# Patient Record
Sex: MALE | Race: WHITE | NOT HISPANIC OR LATINO | Employment: OTHER | URBAN - METROPOLITAN AREA
[De-identification: names, ages, dates, MRNs, and addresses within clinical notes are randomized per-mention and may not be internally consistent; named-entity substitution may affect disease eponyms.]

---

## 2017-02-13 RX ORDER — LOSARTAN POTASSIUM 100 MG/1
TABLET ORAL
Qty: 90 TAB | Refills: 0 | Status: SHIPPED | OUTPATIENT
Start: 2017-02-13 | End: 2019-06-14

## 2017-02-13 NOTE — TELEPHONE ENCOUNTER
Last seen: 08/30/16 by Dr. Ruiz  Next appt: none     Was the patient seen in the last year in this department? Yes   Does patient have an active prescription for medications requested? No   Received Request Via: Pharmacy

## 2017-02-20 ENCOUNTER — HOSPITAL ENCOUNTER (EMERGENCY)
Facility: MEDICAL CENTER | Age: 52
End: 2017-02-20
Attending: EMERGENCY MEDICINE
Payer: MEDICAID

## 2017-02-20 ENCOUNTER — APPOINTMENT (OUTPATIENT)
Dept: RADIOLOGY | Facility: MEDICAL CENTER | Age: 52
End: 2017-02-20
Attending: EMERGENCY MEDICINE
Payer: MEDICAID

## 2017-02-20 VITALS
WEIGHT: 315 LBS | DIASTOLIC BLOOD PRESSURE: 89 MMHG | OXYGEN SATURATION: 95 % | SYSTOLIC BLOOD PRESSURE: 141 MMHG | HEART RATE: 94 BPM | TEMPERATURE: 98.1 F | RESPIRATION RATE: 16 BRPM | BODY MASS INDEX: 40.43 KG/M2 | HEIGHT: 74 IN

## 2017-02-20 DIAGNOSIS — M77.8 LEFT SHOULDER TENDINITIS: ICD-10-CM

## 2017-02-20 PROCEDURE — A9270 NON-COVERED ITEM OR SERVICE: HCPCS | Performed by: EMERGENCY MEDICINE

## 2017-02-20 PROCEDURE — 73030 X-RAY EXAM OF SHOULDER: CPT | Mod: LT

## 2017-02-20 PROCEDURE — 99284 EMERGENCY DEPT VISIT MOD MDM: CPT

## 2017-02-20 PROCEDURE — 700102 HCHG RX REV CODE 250 W/ 637 OVERRIDE(OP): Performed by: EMERGENCY MEDICINE

## 2017-02-20 RX ORDER — OXYCODONE HYDROCHLORIDE AND ACETAMINOPHEN 5; 325 MG/1; MG/1
1-2 TABLET ORAL EVERY 4 HOURS PRN
Qty: 15 TAB | Refills: 0 | Status: SHIPPED | OUTPATIENT
Start: 2017-02-20 | End: 2019-06-14

## 2017-02-20 RX ORDER — OXYCODONE HYDROCHLORIDE AND ACETAMINOPHEN 5; 325 MG/1; MG/1
2 TABLET ORAL ONCE
Status: COMPLETED | OUTPATIENT
Start: 2017-02-20 | End: 2017-02-20

## 2017-02-20 RX ADMIN — OXYCODONE HYDROCHLORIDE AND ACETAMINOPHEN 2 TABLET: 5; 325 TABLET ORAL at 10:39

## 2017-02-20 ASSESSMENT — PAIN SCALES - GENERAL: PAINLEVEL_OUTOF10: 10

## 2017-02-20 NOTE — ED PROVIDER NOTES
ED Provider Note    CHIEF COMPLAINT  Chief Complaint   Patient presents with   • Shoulder Pain     lt shoulder pain       HPI  Jesus Banks is a 51 y.o. male who presents for evaluation of left shoulder pain. The patient has extensive past medical history as listed below. Over the past week he had progressive worsening of left shoulder pain. He specifically denies chest pain or shortness of breath distinctly reports he has pain only with range of motion primarily at the before meals joint. He reports pain is somewhat to the shoulder blade as well. He denies any recent trauma. He thinks he may have slept on it oddly. Denies any pleuritic chest pain no numbness weakness or tingling to the left upper extremity. No history of prior surgery on the left upper extremity or shoulder. The only thing that makes it better is rest moving the shoulder in any degree elicit significant pain    REVIEW OF SYSTEMS  See HPI for further details. No high fevers chills night sweats or weight loss numbness tingling or weakness All other systems are negative.     PAST MEDICAL HISTORY  Past Medical History   Diagnosis Date   • Heart burn    • Indigestion    • Breath shortness    • Snoring    • Cataract    • Arthritis      kn ees and shoulder   • Diabetes      type 1 Insuline + oral meds   • Bronchitis 2013   • Sleep apnea      no cpap use   • Psychiatric problem      bi polar   • Hypertension      well controlled on meds   • Obstructive sleep apnea    • Cortical age-related cataract of right eye    • Pain in left knee    • Sore throat    • Diabetic foot ulcer    • Osteoarthritis of knee      Bilateral    • Low back pain    • Peripheral neuropathy    • Dyslipidemia    • Right ankle pain    • Knee pain, right    • Tobacco use    • Morbid obesity    • Bipolar disorder    • Diabetes mellitus, type II        FAMILY HISTORY  No history of bleeding disorder     SOCIAL HISTORY  Social History     Social History   • Marital Status: Single      Spouse Name: N/A   • Number of Children: N/A   • Years of Education: N/A     Social History Main Topics   • Smoking status: Former Smoker -- 1.00 packs/day for 20 years     Types: Cigarettes     Quit date: 03/12/2014   • Smokeless tobacco: Not on file   • Alcohol Use: No   • Drug Use: No   • Sexual Activity: Not on file     Other Topics Concern   • Not on file     Social History Narrative    former smoker denies IV drugs    SURGICAL HISTORY  Past Surgical History   Procedure Laterality Date   • Lumbar laminectomy diskectomy  2000   • Knee arthroscopy Right 11/5/2015     Procedure: KNEE ARTHROSCOPY , SYNOVECTOMY, OSTEOPHYTE REMOVAL, CHONDROPLASTY, MEDIAL MENISCAL DEBRIDEMENT;  Surgeon: Adama Henriquez M.D.;  Location: SURGERY [facility];  Service:        CURRENT MEDICATIONS  No current facility-administered medications for this encounter.    Current outpatient prescriptions:   •  losartan (COZAAR) 100 MG Tab, TAKE 1 TABLET EVERY DAY, Disp: 90 Tab, Rfl: 0  •  chlorthalidone (HYGROTON) 25 MG Tab, Take 1 Tab by mouth every day., Disp: 90 Tab, Rfl: 0  •  pantoprazole (PROTONIX) 40 MG Tablet Delayed Response, TAKE 1 TABLET BY MOUTH ONCE DAILY, Disp: 90 Tab, Rfl: 0  •  liraglutide (VICTOZA) 18 MG/3ML Solution Pen-injector injection, Inject 0.3 mL as instructed every bedtime., Disp: 6 mL, Rfl: 5  •  RESTASIS 0.05 % ophthalmic emulsion, INSTILL 1 DROP INTO BOTH EYES TWICE A DAY, Disp: , Rfl: 4  •  fluorometholone (FML) 0.1 % Suspension, INSTILL 1 DROP INTO BOTH EYES 4 TIMES A DAY FOR 2 WEEKS THEN TWICE A DAY, Disp: , Rfl: 1  •  ziprasidone (GEODON) 20 MG Cap, ONE CAP BY MOUTH TWICE A DAY WITH A MEAL, Disp: , Rfl: 5  •  Oxycodone-Acetaminophen (PERCOCET-10)  MG Tab, Take 1-2 Tabs by mouth every four hours as needed for Severe Pain., Disp: 20 Tab, Rfl: 0  •  loratadine (CLARITIN) 10 MG Tab, Take 10 mg by mouth every day., Disp: , Rfl:   •  fluticasone (FLONASE) 50 MCG/ACT nasal spray, , Disp: , Rfl: 4  •   metformin (GLUCOPHAGE) 850 MG Tab, Take 1 Tab by mouth 3 times a day before meals., Disp: 90 Tab, Rfl: 5  •  ondansetron (ZOFRAN ODT) 4 MG TABLET DISPERSIBLE, Take 1 Tab by mouth as needed for Nausea/Vomiting., Disp: 10 Tab, Rfl: 2  •  LANTUS SOLOSTAR 100 UNIT/ML Solution Pen-injector injection, Inject 80 Units as instructed every evening., Disp: 1 PEN, Rfl: 5  •  insulin lispro, Human, (HUMALOG) 100 UNIT/ML Solution Pen-injector injection, Inject 5-7 Units as instructed 3 times a day before meals. 2 units for every 5 units over 50, Disp: 1 PEN, Rfl: 3  •  divalproex (DEPAKOTE) 500 MG Tablet Delayed Response, TAKE 1 TABLET BY MOUTH TWICE A DAY, Disp: , Rfl: 1  •  LYRICA 100 MG Cap, TAKE ONE CAPSULE BY MOUTH TWICE A DAY, Disp: 180 Cap, Rfl: 0  •  tramadol (ULTRAM) 50 MG Tab, Take 2 Tabs by mouth every 6 hours as needed. AS NEEDED FOR PAIN, Disp: 180 Tab, Rfl: 0  •  insulin glargine (LANTUS) 100 UNIT/ML Solution, Inject 80 Units as instructed every evening., Disp: , Rfl:   •  polyethylene glycol/lytes (MIRALAX) Pack, Take 17 g by mouth every day., Disp: , Rfl:   •  insulin detemir (LEVEMIR) 100 UNIT/ML Solution Pen-injector injection, Inject  as instructed every evening., Disp: , Rfl:   •  Insulin Pen Needle 31G X 8 MM Misc, by Does not apply route., Disp: , Rfl:   •  glucose blood strip, 1 Each by Other route as needed., Disp: , Rfl:   •  Insulin Pen Needle 32G X 4 MM Misc, by Does not apply route., Disp: , Rfl:   •  mupirocin (BACTROBAN) 2 % Ointment, Apply 1 Each to affected area(s) every day., Disp: , Rfl:       ALLERGIES  Allergies   Allergen Reactions   • Latex      rash   • Other Environmental      Lavender and musk pt states difficulty breathing   • Pcn [Penicillins] Hives and Swelling     Patient states he tolerated amoxicillin/clavulanate with minor pruritis.    • Doxycycline    • Other Misc      BLEACH-Inflammation on skin       PHYSICAL EXAM  VITAL SIGNS: /94 mmHg  Pulse 115  Temp(Src) 37 °C (98.6  "°F)  Resp 16  Ht 1.88 m (6' 2\")  Wt 161.9 kg (356 lb 14.8 oz)  BMI 45.81 kg/m2  SpO2 95% Room air O2: 95    Constitutional: Well developed, Well nourished, No acute distress, Non-toxic appearance.   HENT: Normocephalic, Atraumatic, Bilateral external ears normal, Oropharynx moist, No oral exudates, Nose normal.   Eyes: PERRLA, EOMI, Conjunctiva normal, No discharge.   Neck: Normal range of motion, No tenderness, Supple, No stridor.   Cardiovascular: Normal heart rate, Normal rhythm, No murmurs, No rubs, No gallops.   Thorax & Lungs: Normal breath sounds, No respiratory distress, No wheezing, No chest tenderness.   Abdomen: Bowel sounds normal, Soft, No tenderness, No masses, No pulsatile masses.   Skin: Warm, Dry, No erythema, No rash.   Back: No tenderness, No CVA tenderness.   Extremities: Patient has pain with range of motion in the left shoulder. No erythema no warmth. No bony tenderness over the proximal humerus no evidence of before meals step-off or dislocation neurovascular exam including sensation over the deltoid radial pulse strength is normal.  Neurologic: Alert & oriented x 3, Normal motor function, Normal sensory function, No focal deficits noted.   Psychiatric: Affect normal, Judgment normal, Mood normal.       RADIOLOGY/PROCEDURES  DX-SHOULDER 2+ LEFT   Final Result      No evidence of acute fracture or dislocation.      Mild degenerative changes.      Tiny calcific density adjacent to the humeral head can be seen in calcific tendinopathy.            COURSE & MEDICAL DECISION MAKING  Pertinent Labs & Imaging studies reviewed. (See chart for details)  Patient presents here with left shoulder pain. It is very clearly musculoskeletal and radiograph demonstrates some evidence of calcific tendinopathy which is consistent with his symptoms. I did not think he had an underlying cardiopulmonary process as clearly reports pain with range of motion. I will give him a small prescription of pain medicine " and refer him to orthopedics for ongoing management    FINAL IMPRESSION  1.  1. Left shoulder tendinitis             Electronically signed by: Jerry Catalan, 2/20/2017 10:30 AM

## 2017-02-20 NOTE — ED AVS SNAPSHOT
2/20/2017          Jesus Banks  [address] [facility] [address]    Dear Jesus:    [facility] wants to ensure your discharge home is safe and you or your loved ones have had all your questions answered regarding your care after you leave the hospital.    You may receive a telephone call within two days of your discharge.  This call is to make certain you understand your discharge instructions as well as ensure we provided you with the best care possible during your stay with us.     The call will only last approximately 3-5 minutes and will be done by a nurse.    Once again, we want to ensure your discharge home is safe and that you have a clear understanding of any next steps in your care.  If you have any questions or concerns, please do not hesitate to contact us, we are here for you.  Thank you for choosing [facility] for your healthcare needs.    Sincerely,    Brant Henriquez    [facility]

## 2017-02-20 NOTE — ED AVS SNAPSHOT
Home Care Instructions                                                                                                                Jesus Banks   MRN: [id number]    Department:  [facility], Emergency Dept   Date of Visit:  2/20/2017            [facility], Emergency Dept    [address] [address]    Phone:  [phone]      You were seen by     Jerry Catalan M.D.      Your Diagnosis Was     Left shoulder tendinitis     M75.82       These are the medications you received during your hospitalization from 02/20/2017 0927 to 02/20/2017 1143     Date/Time Order Dose Route Action    02/20/2017 1039 oxycodone-acetaminophen (PERCOCET) 5-325 MG per tablet 2 Tab 2 Tab Oral Given      Follow-up Information     1. Follow up with Fermin Juan M.D. In 1 week.    Specialty:  Orthopaedics    Why:  As needed, If symptoms worsen    Contact information    [address]  [facility] [address]  [phone]        Medication Information     Review all of your home medications and newly ordered medications with your primary doctor and/or pharmacist as soon as possible. Follow medication instructions as directed by your doctor and/or pharmacist.     Please keep your complete medication list with you and share with your physician. Update the information when medications are discontinued, doses are changed, or new medications (including over-the-counter products) are added; and carry medication information at all times in the event of emergency situations.               Medication List      ASK your doctor about these medications        Instructions    chlorthalidone 25 MG Tabs   Commonly known as:  HYGROTON    Doctor's comments:  F/u appt due   Take 1 Tab by mouth every day.   Dose:  25 mg       divalproex 500 MG Tbec   Commonly known as:  DEPAKOTE    TAKE 1 TABLET BY MOUTH TWICE A DAY       fluorometholone 0.1 % Susp   Commonly known as:  FML    INSTILL 1 DROP INTO  BOTH EYES 4 TIMES A DAY FOR 2 WEEKS THEN TWICE A DAY       fluticasone 50 MCG/ACT nasal spray   Commonly known as:  FLONASE        glucose blood strip    1 Each by Other route as needed.   Dose:  1 Each       insulin detemir 100 UNIT/ML Sopn injection   Commonly known as:  LEVEMIR    Inject  as instructed every evening.       * insulin glargine 100 UNIT/ML Soln   Commonly known as:  LANTUS    Inject 80 Units as instructed every evening.   Dose:  80 Units       * LANTUS SOLOSTAR 100 UNIT/ML Sopn injection   Generic drug:  insulin glargine    Inject 80 Units as instructed every evening.   Dose:  80 Units       insulin lispro (Human) 100 UNIT/ML Sopn injection   Commonly known as:  HUMALOG    Inject 5-7 Units as instructed 3 times a day before meals. 2 units for every 5 units over 50   Dose:  5-7 Units       * Insulin Pen Needle 31G X 8 MM Misc    by Does not apply route.       * Insulin Pen Needle 32G X 4 MM Misc    by Does not apply route.       liraglutide 18 MG/3ML Sopn injection   Commonly known as:  VICTOZA    Inject 0.3 mL as instructed every bedtime.   Dose:  1.8 mg       loratadine 10 MG Tabs   Commonly known as:  CLARITIN    Take 10 mg by mouth every day.   Dose:  10 mg       losartan 100 MG Tabs   Commonly known as:  COZAAR    TAKE 1 TABLET EVERY DAY       LYRICA 100 MG Caps   Generic drug:  pregabalin    Doctor's comments:  Not to exceed 5 additional fills before 08/27/2016.   TAKE ONE CAPSULE BY MOUTH TWICE A DAY       metformin 850 MG Tabs   Commonly known as:  GLUCOPHAGE    Take 1 Tab by mouth 3 times a day before meals.   Dose:  850 mg       mupirocin 2 % Oint   Commonly known as:  BACTROBAN    Apply 1 Each to affected area(s) every day.   Dose:  1 Each       ondansetron 4 MG Tbdp   Commonly known as:  ZOFRAN ODT    Take 1 Tab by mouth as needed for Nausea/Vomiting.   Dose:  4 mg       * oxycodone-acetaminophen  MG Tabs   What changed:  Another medication with the same name was added. Make sure  you understand how and when to take each.   Commonly known as:  PERCOCET-10   Ask about: Which instructions should I use?    Take 1-2 Tabs by mouth every four hours as needed for Severe Pain.   Dose:  1-2 Tab       * oxycodone-acetaminophen 5-325 MG Tabs   What changed:  You were already taking a medication with the same name, and this prescription was added. Make sure you understand how and when to take each.   Commonly known as:  PERCOCET   Ask about: Which instructions should I use?    Take 1-2 Tabs by mouth every four hours as needed.   Dose:  1-2 Tab       pantoprazole 40 MG Tbec   Commonly known as:  PROTONIX    TAKE 1 TABLET BY MOUTH ONCE DAILY       polyethylene glycol/lytes Pack   Commonly known as:  MIRALAX    Take 17 g by mouth every day.   Dose:  17 g       RESTASIS 0.05 % ophthalmic emulsion   Generic drug:  cyclosporin    INSTILL 1 DROP INTO BOTH EYES TWICE A DAY       tramadol 50 MG Tabs   Commonly known as:  ULTRAM    Doctor's comments:  Not to exceed 5 additional fills before 09/03/2016.   Take 2 Tabs by mouth every 6 hours as needed. AS NEEDED FOR PAIN   Dose:  100 mg       ziprasidone 20 MG Caps   Commonly known as:  GEODON    ONE CAP BY MOUTH TWICE A DAY WITH A MEAL       * Notice:  This list has 6 medication(s) that are the same as other medications prescribed for you. Read the directions carefully, and ask your doctor or other care provider to review them with you.            Procedures and tests performed during your visit     DX-SHOULDER 2+ LEFT        Discharge Instructions       Rotator Cuff Tendinitis  Rotator cuff tendinitis is inflammation of the tough, cord-like bands that connect muscle to bone (tendons) in your rotator cuff. Your rotator cuff is the collection of all the muscles and tendons that connect your arm to your shoulder. Your rotator cuff holds the head of your upper arm bone (humerus) in the cup (fossa) of your shoulder blade (scapula).  CAUSES  Rotator cuff tendinitis is  usually caused by overusing the joint involved.   SIGNS AND SYMPTOMS  · Deep ache in the shoulder also felt on the outside upper arm over the shoulder muscle.  · Point tenderness over the area that is injured.  · Pain comes on gradually and becomes worse with lifting the arm to the side (abduction) or turning it inward (internal rotation).  · May lead to a chronic tear: When a rotator cuff tendon becomes inflamed, it runs the risk of losing its blood supply, causing some tendon fibers to die. This increases the risk that the tendon can fray and partially or completely tear.  DIAGNOSIS  Rotator cuff tendinitis is diagnosed by taking a medical history, performing a physical exam, and reviewing results of imaging exams. The medical history is useful to help determine the type of rotator cuff injury. The physical exam will include looking at the injured shoulder, feeling the injured area, and watching you do range-of-motion exercises. X-ray exams are typically done to rule out other causes of shoulder pain, such as fractures. MRI is the imaging exam usually used for significant shoulder injuries. Sometimes a dye study called CT arthrogram is done, but it is not as widely used as MRI. In some institutions, special ultrasound tests may also be used to aid in the diagnosis.  TREATMENT   Less Severe Cases  · Use of a sling to rest the shoulder for a short period of time. Prolonged use of the sling can cause stiffness, weakness, and loss of motion of the shoulder joint.  · Anti-inflammatory medicines, such as ibuprofen or naproxen sodium, may be prescribed.  More Severe Cases  · Physical therapy.  · Use of steroid injections into the shoulder joint.  · Surgery.  HOME CARE INSTRUCTIONS   · Use a sling or splint until the pain decreases. Prolonged use of the sling can cause stiffness, weakness, and loss of motion of the shoulder joint.  · Apply ice to the injured area:  ¨ Put ice in a plastic bag.  ¨ Place a towel between your  skin and the bag.  ¨ Leave the ice on for 20 minutes, 2-3 times a day.  · Try to avoid use other than gentle range of motion while your shoulder is painful. Use the shoulder and exercise only as directed by your health care provider. Stop exercises or range of motion if pain or discomfort increases, unless directed otherwise by your health care provider.  · Only take over-the-counter or prescription medicines for pain, discomfort, or fever as directed by your health care provider.  · If you were given a shoulder sling and straps (immobilizer), do not remove it except as directed, or until you see a health care provider for a follow-up exam. If you need to remove it, move your arm as little as possible or as directed.  · You may want to sleep on several pillows at night to lessen swelling and pain.  SEEK IMMEDIATE MEDICAL CARE IF:   · Your shoulder pain increases or new pain develops in your arm, hand, or fingers and is not relieved with medicines.  · You have new, unexplained symptoms, especially increased numbness in the hands or loss of strength.  · You develop any worsening of the problems that brought you in for care.  · Your arm, hand, or fingers are numb or tingling.  · Your arm, hand, or fingers are swollen, painful, or turn white or blue.  MAKE SURE YOU:  · Understand these instructions.  · Will watch your condition.  · Will get help right away if you are not doing well or get worse.     This information is not intended to replace advice given to you by your health care provider. Make sure you discuss any questions you have with your health care provider.     Document Released: 03/09/2005 Document Revised: 01/08/2016 Document Reviewed: 07/30/2014  [employer] Patient Education ©2016 [employer]            Patient Information     Patient Information    Following emergency treatment: all patient requiring follow-up care must return either to a private physician or a clinic if your condition worsens  before you are able to obtain further medical attention, please return to the emergency room.     Billing Information    At [facility], we work to make the billing process streamlined for our patients.  Our Representatives are here to answer any questions you may have regarding your hospital bill.  If you have insurance coverage and have supplied your insurance information to us, we will submit a claim to your insurer on your behalf.  Should you have any questions regarding your bill, we can be reached online or by phone as follows:  Online: You are able pay your bills online or live chat with our representatives about any billing questions you may have. We are here to help Monday - Friday from 8:00am to 7:30pm and 9:00am - 12:00pm on Saturdays.  Please visit [email or web address]  for more information.   Phone:  [phone] or [phone]    Please note that your emergency physician, surgeon, pathologist, radiologist, anesthesiologist, and other specialists are not employed by [facility] and will therefore bill separately for their services.  Please contact them directly for any questions concerning their bills at the numbers below:     Emergency Physician Services:  [phone]  [facility]:  [phone]  [facility]:  [phone]  [facility]:  [phone]    1. Your final bill may vary from the amount quoted upon discharge if all procedures are not complete at that time, or if your doctor has additional procedures of which we are not aware. You will receive an additional bill if you return to the Emergency Department at [facility] for suture removal regardless of the facility of which the sutures were placed.     2. Please arrange for settlement of this account at the emergency registration.    3. All self-pay accounts are due in full at the time of treatment.  If you are unable to meet this obligation then payment is  expected within 4-5 days.     4. If you have had radiology studies (CT, X-ray, Ultrasound, MRI), you have received a preliminary result during your emergency department visit. Please contact the radiology department [phone] to receive a copy of your final result. Please discuss the Final result with your primary physician or with the follow up physician provided.     Crisis Hotline:  [place] Crisis Hotline:  9-305-VNGCMWF or 1-236.785.1929  [place] Crisis Hotline:    1-951.895.6617 or 023-833-3315         ED Discharge Follow Up Questions    1. In order to provide you with very good care, we would like to follow up with a phone call in the next few days.  May we have your permission to contact you?     YES /  NO    2. What is the best phone number to call you? (       )_____-__________    3. What is the best time to call you?      Morning  /  Afternoon  /  Evening                   Patient Signature:  ____________________________________________________________    Date:  ____________________________________________________________

## 2017-02-20 NOTE — DISCHARGE INSTRUCTIONS
Rotator Cuff Tendinitis  Rotator cuff tendinitis is inflammation of the tough, cord-like bands that connect muscle to bone (tendons) in your rotator cuff. Your rotator cuff is the collection of all the muscles and tendons that connect your arm to your shoulder. Your rotator cuff holds the head of your upper arm bone (humerus) in the cup (fossa) of your shoulder blade (scapula).  CAUSES  Rotator cuff tendinitis is usually caused by overusing the joint involved.   SIGNS AND SYMPTOMS  · Deep ache in the shoulder also felt on the outside upper arm over the shoulder muscle.  · Point tenderness over the area that is injured.  · Pain comes on gradually and becomes worse with lifting the arm to the side (abduction) or turning it inward (internal rotation).  · May lead to a chronic tear: When a rotator cuff tendon becomes inflamed, it runs the risk of losing its blood supply, causing some tendon fibers to die. This increases the risk that the tendon can fray and partially or completely tear.  DIAGNOSIS  Rotator cuff tendinitis is diagnosed by taking a medical history, performing a physical exam, and reviewing results of imaging exams. The medical history is useful to help determine the type of rotator cuff injury. The physical exam will include looking at the injured shoulder, feeling the injured area, and watching you do range-of-motion exercises. X-ray exams are typically done to rule out other causes of shoulder pain, such as fractures. MRI is the imaging exam usually used for significant shoulder injuries. Sometimes a dye study called CT arthrogram is done, but it is not as widely used as MRI. In some institutions, special ultrasound tests may also be used to aid in the diagnosis.  TREATMENT   Less Severe Cases  · Use of a sling to rest the shoulder for a short period of time. Prolonged use of the sling can cause stiffness, weakness, and loss of motion of the shoulder joint.  · Anti-inflammatory medicines, such as  ibuprofen or naproxen sodium, may be prescribed.  More Severe Cases  · Physical therapy.  · Use of steroid injections into the shoulder joint.  · Surgery.  HOME CARE INSTRUCTIONS   · Use a sling or splint until the pain decreases. Prolonged use of the sling can cause stiffness, weakness, and loss of motion of the shoulder joint.  · Apply ice to the injured area:  ¨ Put ice in a plastic bag.  ¨ Place a towel between your skin and the bag.  ¨ Leave the ice on for 20 minutes, 2-3 times a day.  · Try to avoid use other than gentle range of motion while your shoulder is painful. Use the shoulder and exercise only as directed by your health care provider. Stop exercises or range of motion if pain or discomfort increases, unless directed otherwise by your health care provider.  · Only take over-the-counter or prescription medicines for pain, discomfort, or fever as directed by your health care provider.  · If you were given a shoulder sling and straps (immobilizer), do not remove it except as directed, or until you see a health care provider for a follow-up exam. If you need to remove it, move your arm as little as possible or as directed.  · You may want to sleep on several pillows at night to lessen swelling and pain.  SEEK IMMEDIATE MEDICAL CARE IF:   · Your shoulder pain increases or new pain develops in your arm, hand, or fingers and is not relieved with medicines.  · You have new, unexplained symptoms, especially increased numbness in the hands or loss of strength.  · You develop any worsening of the problems that brought you in for care.  · Your arm, hand, or fingers are numb or tingling.  · Your arm, hand, or fingers are swollen, painful, or turn white or blue.  MAKE SURE YOU:  · Understand these instructions.  · Will watch your condition.  · Will get help right away if you are not doing well or get worse.     This information is not intended to replace advice given to you by your health care provider. Make sure  you discuss any questions you have with your health care provider.     Document Released: 03/09/2005 Document Revised: 01/08/2016 Document Reviewed: 07/30/2014  Elsevier Interactive Patient Education ©2016 [employer]

## 2017-02-20 NOTE — ED NOTES
"PT ambulated to triage c/o lt shoulder pain x 1 week.  PT denies trauma. PT reports pain increases with movement   Chief Complaint   Patient presents with   • Shoulder Pain     lt shoulder pain     Blood pressure 155/94, pulse 115, temperature 37 °C (98.6 °F), resp. rate 16, height 1.88 m (6' 2\"), weight 161.9 kg (356 lb 14.8 oz), SpO2 95 %.    "

## 2017-02-20 NOTE — ED AVS SNAPSHOT
[employer] Access Code: Activation code not generated  Current [employer] Status: Active    [employer]  A secure, online tool to manage your health information     [employer]’s [employer]® is a secure, online tool that connects you to your personalized health information from the privacy of your home -- day or night - making it very easy for you to manage your healthcare. Once the activation process is completed, you can even access your medical information using the [employer] darrell, which is available for free in the Apple Darrell store or Google Play store.     [employer] provides the following levels of access (as shown below):   My Chart Features   [facility] Primary Care Doctor [facility]  Specialists [facility]  Urgent  Care Non-[facility]  Primary Care  Doctor   Email your healthcare team securely and privately 24/7 X X X X   Manage appointments: schedule your next appointment; view details of past/upcoming appointments X      Request prescription refills. X      View recent personal medical records, including lab and immunizations X X X X   View health record, including health history, allergies, medications X X X X   Read reports about your outpatient visits, procedures, consult and ER notes X X X X   See your discharge summary, which is a recap of your hospital and/or ER visit that includes your diagnosis, lab results, and care plan. X X       How to register for [employer]:  1. Go to  [email or web address].  2. Click on the Sign Up Now box, which takes you to the New Member Sign Up page. You will need to provide the following information:  a. Enter your [employer] Access Code exactly as it appears at the top of this page. (You will not need to use this code after you’ve completed the sign-up process. If you do not sign up before the expiration date, you must request a new code.)   b. Enter your date of birth.   c. Enter your home email address.   d. Click Submit, and follow the next screen’s instructions.  3. Create a [employer] ID. This will  be your Mail[employer]Inside login ID and cannot be changed, so think of one that is secure and easy to remember.  4. Create a [employer]Inside password. You can change your password at any time.  5. Enter your Password Reset Question and Answer. This can be used at a later time if you forget your password.   6. Enter your e-mail address. This allows you to receive e-mail notifications when new information is available in Mail'Inside.  7. Click Sign Up. You can now view your health information.    For assistance activating your [employer]Inside account, call [phone]

## 2017-02-20 NOTE — ED NOTES
Pt given d/c instructions and prescription. All questions answered. Pt verbalized understanding. Pt d/c to self.

## 2017-02-23 RX ORDER — CHLORTHALIDONE 25 MG/1
TABLET ORAL
Qty: 90 TAB | Refills: 0 | Status: SHIPPED | OUTPATIENT
Start: 2017-02-23

## 2017-04-19 NOTE — TELEPHONE ENCOUNTER
Last seen: 8/30/16 by Dr. Ruiz  Next appt: None    Was the patient seen in the last year in this department? Yes   Does patient have an active prescription for medications requested? No   Received Request Via: Pharmacy

## 2017-04-21 RX ORDER — LORATADINE 10 MG/1
10 TABLET ORAL DAILY
Qty: 90 TAB | Refills: 1 | Status: SHIPPED | OUTPATIENT
Start: 2017-04-21

## 2018-09-27 ENCOUNTER — HOME HEALTH ADMISSION (OUTPATIENT)
Dept: HOME HEALTH SERVICES | Facility: HOME HEALTHCARE | Age: 53
End: 2018-09-27
Payer: MEDICAID

## 2019-05-30 ENCOUNTER — PATIENT OUTREACH (OUTPATIENT)
Dept: HEALTH INFORMATION MANAGEMENT | Facility: OTHER | Age: 54
End: 2019-05-30

## 2019-05-30 NOTE — PROGRESS NOTES
Declined Care Management - Called patient to set up an appointment at one of our locations who does accept his health insurance medicaid. The patient stated he does not have a vehicle or transportation to be scheduling out of town but will call if anything changes.

## 2019-06-14 ENCOUNTER — HOSPITAL ENCOUNTER (EMERGENCY)
Facility: MEDICAL CENTER | Age: 54
End: 2019-06-14
Attending: EMERGENCY MEDICINE
Payer: MEDICAID

## 2019-06-14 VITALS
BODY MASS INDEX: 40.43 KG/M2 | HEART RATE: 102 BPM | DIASTOLIC BLOOD PRESSURE: 96 MMHG | WEIGHT: 315 LBS | HEIGHT: 74 IN | OXYGEN SATURATION: 98 % | SYSTOLIC BLOOD PRESSURE: 182 MMHG | TEMPERATURE: 97.7 F | RESPIRATION RATE: 16 BRPM

## 2019-06-14 DIAGNOSIS — G43.009 MIGRAINE WITHOUT AURA AND WITHOUT STATUS MIGRAINOSUS, NOT INTRACTABLE: ICD-10-CM

## 2019-06-14 DIAGNOSIS — I10 ESSENTIAL HYPERTENSION: ICD-10-CM

## 2019-06-14 PROCEDURE — 700111 HCHG RX REV CODE 636 W/ 250 OVERRIDE (IP): Performed by: EMERGENCY MEDICINE

## 2019-06-14 PROCEDURE — 96374 THER/PROPH/DIAG INJ IV PUSH: CPT

## 2019-06-14 PROCEDURE — 96375 TX/PRO/DX INJ NEW DRUG ADDON: CPT

## 2019-06-14 PROCEDURE — 99284 EMERGENCY DEPT VISIT MOD MDM: CPT

## 2019-06-14 RX ORDER — BUTALBITAL, ACETAMINOPHEN AND CAFFEINE 50; 325; 40 MG/1; MG/1; MG/1
1 TABLET ORAL EVERY 4 HOURS PRN
Qty: 30 TAB | Refills: 0 | Status: SHIPPED | OUTPATIENT
Start: 2019-06-14 | End: 2019-06-21

## 2019-06-14 RX ORDER — LOSARTAN POTASSIUM 100 MG/1
100 TABLET ORAL DAILY
Qty: 30 TAB | Refills: 0 | Status: SHIPPED | OUTPATIENT
Start: 2019-06-14

## 2019-06-14 RX ORDER — SUMATRIPTAN 25 MG/1
25-50 TABLET, FILM COATED ORAL
Qty: 10 TAB | Refills: 0 | Status: SHIPPED | OUTPATIENT
Start: 2019-06-14

## 2019-06-14 RX ORDER — DIPHENHYDRAMINE HYDROCHLORIDE 50 MG/ML
12.5 INJECTION INTRAMUSCULAR; INTRAVENOUS ONCE
Status: COMPLETED | OUTPATIENT
Start: 2019-06-14 | End: 2019-06-14

## 2019-06-14 RX ADMIN — DIPHENHYDRAMINE HYDROCHLORIDE 12.5 MG: 50 INJECTION INTRAMUSCULAR; INTRAVENOUS at 13:09

## 2019-06-14 RX ADMIN — PROCHLORPERAZINE EDISYLATE 10 MG: 5 INJECTION INTRAMUSCULAR; INTRAVENOUS at 13:10

## 2019-06-14 NOTE — ED NOTES
Reports headache resolved at this time.  IV d/c'd cath intact; no redness, no swelling noted; drsg applied.  D/C home with written and verbal instructions re: Rx, activity, f/u.  Verbalizes understanding.  Ambulated out

## 2019-06-14 NOTE — ED TRIAGE NOTES
Pt comes in complaining of he woke up with a HA this morning. Pt reporting nausea. Pt with hx of the same. Pt is HTN and reporting he has been working with his PCP

## 2019-06-14 NOTE — ED PROVIDER NOTES
ED Provider Note    Scribed for Yovanny Woody M.D. by Rosina Bolivar. 6/14/2019  12:13 PM    Primary care provider: Melani Mason M.D.  Means of arrival: Walk-In  History obtained from: Patient  History limited by: none    CHIEF COMPLAINT  Chief Complaint   Patient presents with   • Headache       HPI  Jesus Banks is a 54 y.o. Diabetic male who presents to the ED for a generalized headache that began when he woke up this morning. He rates his headache as a level 9/10. It is not throbbing. He has associated photophobia, phonophobia, nausea, dry heaving, and cough. He has a history of migraines, which he gets every 2-3 months. He usually treats it with 2 Excedrin and sitting in a dark and quiet room. When they get as severe as they are today, he sees a doctor. He has never taken medicine specifically for migraines. He tried taking tramadol this morning, with no help. It is noted that his blood pressure is elevated at 206/112. Patient states he takes 100mg losartan and chlorthalidone 25mg a day. His baseline systolic pressure si 160. He has discussed this with his PCP, but he says she just ignores it. He denies abdominal pain, sore throat, rhinorrhea, numbness/weakness, facial droop, or speech difficulties. His diabetes is well managed.       REVIEW OF SYSTEMS  Pertinent positives include: headaches, photophobia, phonophobia, nausea, dry heaving, and cough.  Pertinent negatives include: abdominal pain, sore throat, rhinorrhea, numbness/weakness, facail droop, or speech difficulties.    PAST MEDICAL HISTORY  Past Medical History:   Diagnosis Date   • Arthritis     kn ees and shoulder   • Bipolar disorder (HCC)    • Breath shortness    • Bronchitis 2013   • Cataract    • Cortical age-related cataract of right eye    • Diabetes     type 1 Insuline + oral meds   • Diabetes mellitus, type II (HCC)    • Diabetic foot ulcer (HCC)    • Dyslipidemia    • Heart burn    • Hypertension     well controlled on meds   •  "Indigestion    • Knee pain, right    • Low back pain    • Morbid obesity (HCC)    • Obstructive sleep apnea    • Osteoarthritis of knee     Bilateral    • Pain in left knee    • Peripheral neuropathy    • Psychiatric problem     bi polar   • Right ankle pain    • Sleep apnea     no cpap use   • Snoring    • Sore throat    • Tobacco use        CURRENT MEDICATIONS  Home Medications     Reviewed by Raven Killian R.N. (Registered Nurse) on 06/14/19 at 1202  Med List Status: Partial   Medication Last Dose Status   chlorthalidone (HYGROTON) 25 MG Tab 6/14/2019 Active   divalproex (DEPAKOTE) 500 MG Tablet Delayed Response 6/14/2019 Active   fluticasone (FLONASE) 50 MCG/ACT nasal spray 6/14/2019 Active   glucose blood strip 6/14/2019 Active   insulin glargine (LANTUS) 100 UNIT/ML Solution 6/14/2019 Active   insulin lispro, Human, (HUMALOG) 100 UNIT/ML Solution Pen-injector injection 6/14/2019 Active   Insulin Pen Needle 31G X 8 MM Misc 6/14/2019 Active   Insulin Pen Needle 32G X 4 MM Misc 6/14/2019 Active   loratadine (CLARITIN) 10 MG Tab 6/14/2019 Active   losartan (COZAAR) 100 MG Tab 6/14/2019 Active   metformin (GLUCOPHAGE) 850 MG Tab 6/14/2019 Active   pantoprazole (PROTONIX) 40 MG Tablet Delayed Response 6/14/2019 Active   polyethylene glycol/lytes (MIRALAX) Pack 6/14/2019 Active   tramadol (ULTRAM) 50 MG Tab 6/14/2019 Active   ziprasidone (GEODON) 20 MG Cap 6/14/2019 Active                ALLERGIES  Allergies   Allergen Reactions   • Latex      rash   • Other Environmental      Lavender and musk pt states difficulty breathing   • Pcn [Penicillins] Hives and Swelling     Patient states he tolerated amoxicillin/clavulanate with minor pruritis.    • Other Misc      BLEACH-Inflammation on skin       PHYSICAL EXAM  VITAL SIGNS: BP (!) 206/112   Pulse (!) 114   Temp 36.5 °C (97.7 °F) (Temporal)   Resp 20   Ht 1.88 m (6' 2\")   Wt (!) 187.1 kg (412 lb 7.7 oz)   SpO2 96%   BMI 52.96 kg/m²  Reviewed and very " hypertensive, tachycardic  Constitutional :  Well developed, Well nourished, wearing sunglasses due to photophobia.   HNT: Normocephalic, atraumatic.   Eyes: Pupils are 2mm, equal, round, and reactive to light. No eye discharge nor conjunctival hyperemia.  Neck: Normal range of motion, No tenderness, Supple, No stridor.   Lymphatic: No lymphadenopathy.   Cardiovascular: Tachycardia. No murmurs, No rubs, No gallops.  No cyanosis.   Respiratory: Non labored respirations.   Abdomen:  Soft, nontender  Skin: Warm, dry, no erythema, no rash.   Musculoskeletal: no limb deformities.  Neurological: Cranial nerves II-XII are intact, Grasp, biceps, extensor hallucis longus, ankle plantar flexion are 5/5 and symmetric, Finger nose finger and fine motor normal. Sensation is intact to light touch in all 4 limbs.  No focal deficits noted.      INTERVENTIONS:  Medications   prochlorperazine (COMPAZINE) injection 10 mg (10 mg Intravenous Given 6/14/19 1310)   diphenhydrAMINE (BENADRYL) injection 12.5 mg (12.5 mg Intravenous Given 6/14/19 1309)     Response: Resolution of headache, improvement in blood pressure to 186 systolic    ED COURSE:  12:13 PM - Patient seen and examined at bedside. Patient will be treated with Compazine 10 mg and Benadryl 12.5 mg for his symptoms. I will reevaluate the patient once the medications are administered and have time to take effect.     2:12 PM - I reevaluated the patient at bedside. He states his headache has completely resolved with the Compazine. He is stable for discharge at this time. I will double his dose of Losartan to help control his blood pressure, however he should follow up with his PCP as soon as possible to discuss managing it. He understands and will comply.     Patient has a known diagnosis of hypertension, and is being followed by his primary care provider for management.     MEDICAL DECISION MAKING:  Well-appearing patient presents with typical migraine headache without evidence  of subarachnoid hemorrhage, venous sinus thrombosis, carotid dissection hydrocephalus or brain mass.  There is no history of trauma.  He has a history of hypertension with very elevated blood pressure that was likely elevated secondary Alejandro his headache pain and not primarily a cause of the headache pain.  Given improvement in pain and typical migraine features CT imaging for subarachnoid hemorrhage unlikely to be of benefit.  Given his elevated blood pressure I will double his losartan 200 mg daily.    DISPOSITION: Home    PLAN:  New Prescriptions    ACETAMINOPHEN/CAFFEINE/BUTALBITAL 325-40-50 MG (FIORICET) -40 MG TAB    Take 1 Tab by mouth every four hours as needed for Headache for up to 7 days.    LOSARTAN (COZAAR) 100 MG TAB    Take 1 Tab by mouth every day.    SUMATRIPTAN (IMITREX) 25 MG TAB TABLET    Take 1-2 Tabs by mouth Once PRN for Migraine for up to 1 dose.     Migraine Headache handout given  Return for sudden onset, severe headache, headache and fever or headache and weakness    Melani Mason M.D.  [address] [address]  [phone]    Schedule an appointment as soon as possible for a visit   As needed if not better 1-2 days      CONDITION:  Good.    FINAL IMPRESSION:  1. Migraine without aura and without status migrainosus, not intractable    2. Essential hypertension         I, Rosina Bolivar (Scribe), am scribing for, and in the presence of, Yovanny Woody M.D..    Electronically signed by: Rosina Bolivar (Scribori), 6/14/2019    Electronically signed by: Rosina Bolivar, 6/14/2019    The note accurately reflects work and decisions made by me.  Yovanny Woody  6/14/2019  3:47 PM

## 2019-06-14 NOTE — ED NOTES
Ambulatory (with cane) to 67 with same report as triage note.  Reports Hx of MHA and symptoms feel same.  Reports increased sensitivity to light and noise.  Denies recent trauma or injury to head.

## 2019-06-14 NOTE — DISCHARGE INSTRUCTIONS
Migraine Headache    Take ibuprofen and fioricet or imitres immediately at the first hint of headache.  Return for sudden onset, severe headache, headache and fever or headache and weakness.    You had a borderline or high normal blood pressure reading today.  This does not necessarily mean you have hypertension.  Please followup with your/a primary physician for comprehensive blood pressure evaluation and yearly fasting cholesterol assessment.  BP Readings from Last 3 Encounters:   06/14/19 (!) 206/112   02/20/17 141/89   08/30/16 119/63         You have a migraine headache. Symptoms of migraines include a throbbing headache, made worse by activity. Sometimes only one side of the head hurts. Nausea, vomiting and sinus pain or stuffiness is common with migraines. Visual symptoms such as light sensitivity, blind spots, or flashing lights may also occur. Loud noises may make migraine pain worse. Migraine headaches are caused by changes in the size of the blood vessels in the head and neck. Many factors may cause this problem including:  Emotional stress, lack of sleep, and menstrual periods.  Alcohol and some drugs (such as birth control pills).  Diet factors (fasting, caffeine, food preservatives, chocolate).  Environmental factors (weather changes, bright lights, odors, smoke).  Jarring motions and loud noises may also bring on a migraine. Prevention of migraines includes dealing with the trigger factors.    Treatment may require medicines for pain, inflammation, and vomiting. Injections for pain and IV fluids can be used if the headache is very severe, or if you are vomiting repeatedly. Get plenty of rest over the next 24 hours.  Lie down in a quiet, dark place and try to sleep  Medicines to prevent the blood vessel changes may be prescribed.  For people with frequent migraines, other medicines such as beta blockers and antidepressants may be helpful. Please see your caregiver if you are not better in 1-2 days.      SEEK IMMEDIATE MEDICAL CARE IF:  You develop a high fever, repeated vomiting, dehydration, or extreme weakness  You develop fainting, worsening headache, confusion, or seizures  You develop numbness or weakness of the limbs    [employer]® Patient Information ©2007 [employer]

## 2019-11-12 ENCOUNTER — HOSPITAL ENCOUNTER (OUTPATIENT)
Dept: LAB | Facility: MEDICAL CENTER | Age: 54
End: 2019-11-12
Attending: STUDENT IN AN ORGANIZED HEALTH CARE EDUCATION/TRAINING PROGRAM
Payer: MEDICAID

## 2019-11-12 LAB
ALBUMIN SERPL BCP-MCNC: 3.7 G/DL (ref 3.2–4.9)
ALBUMIN/GLOB SERPL: 1 G/DL
ALP SERPL-CCNC: 126 U/L (ref 30–99)
ALT SERPL-CCNC: 12 U/L (ref 2–50)
ANION GAP SERPL CALC-SCNC: 11 MMOL/L (ref 0–11.9)
AST SERPL-CCNC: 14 U/L (ref 12–45)
BASOPHILS # BLD AUTO: 0.9 % (ref 0–1.8)
BASOPHILS # BLD: 0.07 K/UL (ref 0–0.12)
BILIRUB SERPL-MCNC: 0.3 MG/DL (ref 0.1–1.5)
BUN SERPL-MCNC: 46 MG/DL (ref 8–22)
CALCIUM SERPL-MCNC: 9.3 MG/DL (ref 8.5–10.5)
CHLORIDE SERPL-SCNC: 93 MMOL/L (ref 96–112)
CHOLEST SERPL-MCNC: 234 MG/DL (ref 100–199)
CO2 SERPL-SCNC: 36 MMOL/L (ref 20–33)
CORTIS SERPL-MCNC: 5.6 UG/DL (ref 0–23)
CREAT SERPL-MCNC: 1.8 MG/DL (ref 0.5–1.4)
EOSINOPHIL # BLD AUTO: 0.37 K/UL (ref 0–0.51)
EOSINOPHIL NFR BLD: 4.6 % (ref 0–6.9)
ERYTHROCYTE [DISTWIDTH] IN BLOOD BY AUTOMATED COUNT: 41.6 FL (ref 35.9–50)
EST. AVERAGE GLUCOSE BLD GHB EST-MCNC: 235 MG/DL
GLOBULIN SER CALC-MCNC: 3.7 G/DL (ref 1.9–3.5)
GLUCOSE SERPL-MCNC: 234 MG/DL (ref 65–99)
HBA1C MFR BLD: 9.8 % (ref 0–5.6)
HCT VFR BLD AUTO: 36.1 % (ref 42–52)
HDLC SERPL-MCNC: 36 MG/DL
HGB BLD-MCNC: 11.7 G/DL (ref 14–18)
IMM GRANULOCYTES # BLD AUTO: 0.04 K/UL (ref 0–0.11)
IMM GRANULOCYTES NFR BLD AUTO: 0.5 % (ref 0–0.9)
LDLC SERPL CALC-MCNC: 125 MG/DL
LYMPHOCYTES # BLD AUTO: 2.72 K/UL (ref 1–4.8)
LYMPHOCYTES NFR BLD: 33.7 % (ref 22–41)
MCH RBC QN AUTO: 29.5 PG (ref 27–33)
MCHC RBC AUTO-ENTMCNC: 32.4 G/DL (ref 33.7–35.3)
MCV RBC AUTO: 90.9 FL (ref 81.4–97.8)
MONOCYTES # BLD AUTO: 0.59 K/UL (ref 0–0.85)
MONOCYTES NFR BLD AUTO: 7.3 % (ref 0–13.4)
NEUTROPHILS # BLD AUTO: 4.28 K/UL (ref 1.82–7.42)
NEUTROPHILS NFR BLD: 53 % (ref 44–72)
NRBC # BLD AUTO: 0 K/UL
NRBC BLD-RTO: 0 /100 WBC
PLATELET # BLD AUTO: 247 K/UL (ref 164–446)
PMV BLD AUTO: 10.5 FL (ref 9–12.9)
POTASSIUM SERPL-SCNC: 4 MMOL/L (ref 3.6–5.5)
PROT SERPL-MCNC: 7.4 G/DL (ref 6–8.2)
RBC # BLD AUTO: 3.97 M/UL (ref 4.7–6.1)
SODIUM SERPL-SCNC: 140 MMOL/L (ref 135–145)
T4 FREE SERPL-MCNC: 1.09 NG/DL (ref 0.53–1.43)
TRIGL SERPL-MCNC: 363 MG/DL (ref 0–149)
TSH SERPL DL<=0.005 MIU/L-ACNC: 3.2 UIU/ML (ref 0.38–5.33)
WBC # BLD AUTO: 8.1 K/UL (ref 4.8–10.8)

## 2019-11-12 PROCEDURE — 84443 ASSAY THYROID STIM HORMONE: CPT

## 2019-11-12 PROCEDURE — 83036 HEMOGLOBIN GLYCOSYLATED A1C: CPT

## 2019-11-12 PROCEDURE — 82088 ASSAY OF ALDOSTERONE: CPT

## 2019-11-12 PROCEDURE — 84439 ASSAY OF FREE THYROXINE: CPT

## 2019-11-12 PROCEDURE — 85025 COMPLETE CBC W/AUTO DIFF WBC: CPT

## 2019-11-12 PROCEDURE — 80061 LIPID PANEL: CPT

## 2019-11-12 PROCEDURE — 36415 COLL VENOUS BLD VENIPUNCTURE: CPT

## 2019-11-12 PROCEDURE — 82533 TOTAL CORTISOL: CPT

## 2019-11-12 PROCEDURE — 80053 COMPREHEN METABOLIC PANEL: CPT

## 2019-11-13 LAB — ALDOST SERPL-MCNC: 12.9 NG/DL
